# Patient Record
Sex: MALE | Race: BLACK OR AFRICAN AMERICAN | URBAN - METROPOLITAN AREA
[De-identification: names, ages, dates, MRNs, and addresses within clinical notes are randomized per-mention and may not be internally consistent; named-entity substitution may affect disease eponyms.]

---

## 2019-03-21 ENCOUNTER — OFFICE VISIT (OUTPATIENT)
Dept: URBAN - METROPOLITAN AREA CLINIC 11 | Facility: CLINIC | Age: 74
End: 2019-03-21
Payer: COMMERCIAL

## 2019-03-21 DIAGNOSIS — H25.13 AGE-RELATED NUCLEAR CATARACT, BILATERAL: ICD-10-CM

## 2019-03-21 PROCEDURE — 92002 INTRM OPH EXAM NEW PATIENT: CPT | Performed by: OPTOMETRIST

## 2019-03-21 PROCEDURE — 92133 CPTRZD OPH DX IMG PST SGM ON: CPT | Performed by: OPTOMETRIST

## 2019-03-21 ASSESSMENT — INTRAOCULAR PRESSURE
OS: 16
OD: 18

## 2019-03-21 ASSESSMENT — KERATOMETRY
OD: 43.63
OS: 43.88

## 2019-03-21 ASSESSMENT — VISUAL ACUITY
OD: 20/30
OS: 20/40

## 2019-03-21 NOTE — IMPRESSION/PLAN
Impression: Age-related nuclear cataract, bilateral: H25.13. Plan: Discussed diagnosis in detail with patient. No treatment is required at this time. Will continue to observe condition and or symptoms. Call if [address] worsens.

## 2019-03-21 NOTE — IMPRESSION/PLAN
Impression: Chronic angle-closure glaucoma, bilateral, severe stage: P84.2341. Plan:  Angles appear occludable OU. Refer to MD for PI eval. s/s of angle closure call office or go to ER. OCT of RNFL ordered and performed today ou. RNFL thinning ou. Continue Latanoprost 1gt qhs ou and ro/dorz bid ou.

## 2019-04-26 ENCOUNTER — OFFICE VISIT (OUTPATIENT)
Dept: URBAN - METROPOLITAN AREA CLINIC 11 | Facility: CLINIC | Age: 74
End: 2019-04-26
Payer: COMMERCIAL

## 2019-04-26 PROCEDURE — 92002 INTRM OPH EXAM NEW PATIENT: CPT | Performed by: OPHTHALMOLOGY

## 2019-04-26 PROCEDURE — 92020 GONIOSCOPY: CPT | Performed by: OPHTHALMOLOGY

## 2019-04-26 PROCEDURE — 92133 CPTRZD OPH DX IMG PST SGM ON: CPT | Performed by: OPHTHALMOLOGY

## 2019-04-26 PROCEDURE — 92012 INTRM OPH EXAM EST PATIENT: CPT | Performed by: OPHTHALMOLOGY

## 2019-04-26 PROCEDURE — 76514 ECHO EXAM OF EYE THICKNESS: CPT | Performed by: OPHTHALMOLOGY

## 2019-04-26 RX ORDER — PREDNISOLONE ACETATE 10 MG/ML
1 % SUSPENSION/ DROPS OPHTHALMIC
Qty: 1 | Refills: 0 | Status: INACTIVE
Start: 2019-04-26 | End: 2019-09-25

## 2019-04-26 ASSESSMENT — INTRAOCULAR PRESSURE
OD: 19
OS: 18

## 2019-04-26 NOTE — IMPRESSION/PLAN
Impression: Chronic angle-closure glaucoma, bilateral, severe stage: R13.9574. /496, 4/19 OCT 67/62 7/7 Plan: Discussed diagnosis with patient. Recommend patient continue Latanoprost QHS OU and Dorzolamide/Timolol BID OU. [id number] [address] for LPI OU in [facility], [address]. Discussed narrow angles, risk of angle closure, symptoms of AACG and Laser peripheral iridotomy (LPI) risk/benefits/alternatives. Discussed that pt should avoid dilation and anti-depression, anti-anxiety, anti-histamine, or other medications contraindicated in angle closure glaucoma until the laser has been performed. Discussed risk of irreversible vision loss with glaucoma. Pt voiced understanding.

## 2019-05-15 ENCOUNTER — SURGERY (OUTPATIENT)
Dept: URBAN - METROPOLITAN AREA SURGERY 20 | Facility: SURGERY | Age: 74
End: 2019-05-15
Payer: COMMERCIAL

## 2019-05-22 ENCOUNTER — POST-OPERATIVE VISIT (OUTPATIENT)
Dept: URBAN - METROPOLITAN AREA CLINIC 11 | Facility: CLINIC | Age: 74
End: 2019-05-22

## 2019-05-22 PROCEDURE — 99024 POSTOP FOLLOW-UP VISIT: CPT | Performed by: OPTOMETRIST

## 2019-05-22 ASSESSMENT — INTRAOCULAR PRESSURE
OD: 36
OS: 36

## 2019-06-04 ENCOUNTER — POST-OPERATIVE VISIT (OUTPATIENT)
Dept: URBAN - METROPOLITAN AREA CLINIC 11 | Facility: CLINIC | Age: 74
End: 2019-06-04
Payer: COMMERCIAL

## 2019-06-04 PROCEDURE — 99024 POSTOP FOLLOW-UP VISIT: CPT | Performed by: OPTOMETRIST

## 2019-06-04 ASSESSMENT — INTRAOCULAR PRESSURE
OD: 24
OS: 19

## 2019-08-14 ENCOUNTER — OFFICE VISIT (OUTPATIENT)
Dept: URBAN - METROPOLITAN AREA CLINIC 11 | Facility: CLINIC | Age: 74
End: 2019-08-14
Payer: COMMERCIAL

## 2019-08-14 DIAGNOSIS — H40.2233 CHRONIC ANGLE-CLOSURE GLAUCOMA, BILATERAL, SEVERE STAGE: Primary | ICD-10-CM

## 2019-08-14 PROCEDURE — 99213 OFFICE O/P EST LOW 20 MIN: CPT | Performed by: OPHTHALMOLOGY

## 2019-08-14 ASSESSMENT — INTRAOCULAR PRESSURE
OS: 21
OD: 22

## 2019-08-14 NOTE — IMPRESSION/PLAN
Impression: Chronic angle-closure glaucoma, bilateral, severe stage: Y73.8932. Due to CCT goal IOP low teens. /496, 4/19 OCT 67/62 7/7 Plan: IOP too high for ONH. Discussed diagnosis with patient. IOP elevated. Recommend patient d/c Latanoprost. Start Rocklatan QHS OU (sample dispensed to patient) continue Dorzolamide/Timolol BID OU (medication sent to pharmacy) 1 month IOP check, Optos and HVF with Dr José Alvarez.

## 2019-09-23 ENCOUNTER — TESTING ONLY (OUTPATIENT)
Dept: URBAN - METROPOLITAN AREA CLINIC 11 | Facility: CLINIC | Age: 74
End: 2019-09-23
Payer: COMMERCIAL

## 2019-09-23 PROCEDURE — 92083 EXTENDED VISUAL FIELD XM: CPT | Performed by: OPHTHALMOLOGY

## 2019-09-25 ENCOUNTER — OFFICE VISIT (OUTPATIENT)
Dept: URBAN - METROPOLITAN AREA CLINIC 11 | Facility: CLINIC | Age: 74
End: 2019-09-25
Payer: COMMERCIAL

## 2019-09-25 PROCEDURE — 99213 OFFICE O/P EST LOW 20 MIN: CPT | Performed by: OPHTHALMOLOGY

## 2019-09-25 RX ORDER — PREDNISOLONE ACETATE 10 MG/ML
1 % SUSPENSION/ DROPS OPHTHALMIC
Qty: 1 | Refills: 0 | Status: INACTIVE
Start: 2019-09-25 | End: 2021-01-26

## 2019-09-25 ASSESSMENT — INTRAOCULAR PRESSURE
OD: 21
OS: 21

## 2019-09-25 NOTE — IMPRESSION/PLAN
Impression: Chronic angle-closure glaucoma, bilateral, severe stage: E70.2051. Due to CCT goal IOP low teens. /496, 4/19 OCT 67/62 7/7 Rocklatan causes redness. Plan: IOP too high for [address]. Discussed diagnosis and treatment options with patient. Unable to tolerate Rocklatan. Recommend patient continue Latanoprost and Dorzolamide/Timolol BID OU. Discussed the option of the Micropulse G6 Laser procedure to help aid w/ Glaucoma. Recommend Micropulse cytophotocoagulation OS, then OD. R/B/A of procedure discussed. Pt desires to proceed with procedure.

## 2019-10-16 ENCOUNTER — SURGERY (OUTPATIENT)
Dept: URBAN - METROPOLITAN AREA SURGERY 20 | Facility: SURGERY | Age: 74
End: 2019-10-16
Payer: COMMERCIAL

## 2019-10-16 PROCEDURE — 66170 GLAUCOMA SURGERY: CPT | Performed by: OPHTHALMOLOGY

## 2019-10-17 ENCOUNTER — POST-OPERATIVE VISIT (OUTPATIENT)
Dept: URBAN - METROPOLITAN AREA CLINIC 11 | Facility: CLINIC | Age: 74
End: 2019-10-17

## 2019-10-17 PROCEDURE — 99024 POSTOP FOLLOW-UP VISIT: CPT | Performed by: OPTOMETRIST

## 2019-10-17 ASSESSMENT — INTRAOCULAR PRESSURE
OD: 21
OS: 14

## 2019-10-22 ENCOUNTER — POST-OPERATIVE VISIT (OUTPATIENT)
Dept: URBAN - METROPOLITAN AREA CLINIC 45 | Facility: CLINIC | Age: 74
End: 2019-10-22

## 2019-10-22 DIAGNOSIS — Z09 ENCNTR FOR F/U EXAM AFT TRTMT FOR COND OTH THAN MALIG NEOPLM: Primary | ICD-10-CM

## 2019-10-22 ASSESSMENT — INTRAOCULAR PRESSURE
OS: 11
OD: 19

## 2019-12-11 ENCOUNTER — SURGERY (OUTPATIENT)
Dept: URBAN - METROPOLITAN AREA SURGERY 20 | Facility: SURGERY | Age: 74
End: 2019-12-11
Payer: COMMERCIAL

## 2019-12-11 PROCEDURE — 66710 CILIARY TRANSSLERAL THERAPY: CPT | Performed by: OPHTHALMOLOGY

## 2019-12-12 ENCOUNTER — POST-OPERATIVE VISIT (OUTPATIENT)
Dept: URBAN - METROPOLITAN AREA CLINIC 11 | Facility: CLINIC | Age: 74
End: 2019-12-12

## 2019-12-12 PROCEDURE — 99024 POSTOP FOLLOW-UP VISIT: CPT | Performed by: OPTOMETRIST

## 2019-12-12 ASSESSMENT — INTRAOCULAR PRESSURE
OS: 18
OD: 17

## 2019-12-18 ENCOUNTER — POST-OPERATIVE VISIT (OUTPATIENT)
Dept: URBAN - METROPOLITAN AREA CLINIC 11 | Facility: CLINIC | Age: 74
End: 2019-12-18

## 2019-12-18 RX ORDER — DORZOLAMIDE HYDROCHLORIDE AND TIMOLOL MALEATE 20; 5 MG/ML; MG/ML
SOLUTION/ DROPS OPHTHALMIC
Qty: 15 | Refills: 3 | Status: INACTIVE
Start: 2019-12-18 | End: 2020-05-04

## 2019-12-18 ASSESSMENT — INTRAOCULAR PRESSURE
OD: 11
OS: 12

## 2020-03-11 ENCOUNTER — OFFICE VISIT (OUTPATIENT)
Dept: URBAN - METROPOLITAN AREA CLINIC 11 | Facility: CLINIC | Age: 75
End: 2020-03-11
Payer: MEDICARE

## 2020-03-11 PROCEDURE — 99213 OFFICE O/P EST LOW 20 MIN: CPT | Performed by: OPHTHALMOLOGY

## 2020-03-11 ASSESSMENT — INTRAOCULAR PRESSURE
OS: 16
OD: 14

## 2020-03-11 NOTE — IMPRESSION/PLAN
Impression: Chronic angle-closure glaucoma, bilateral, severe stage: E97.6887. Due to CCT goal IOP low teens. /496, 4/19 OCT 67/62 7/7 Rocklatan causes redness. s/p Micropulse  OD 12/11/19 OS 10/16/19 Plan: Discussed diagnosis and treatment options with patient. Recommend patient continue Latanoprost QHS OU and Dorzolamide/Timolol BID OU. Will continue to monitor. 4 month IOP check Dr Cruzito Flor.

## 2020-06-16 ENCOUNTER — OFFICE VISIT (OUTPATIENT)
Dept: URBAN - METROPOLITAN AREA CLINIC 11 | Facility: CLINIC | Age: 75
End: 2020-06-16
Payer: MEDICARE

## 2020-06-16 PROCEDURE — 92012 INTRM OPH EXAM EST PATIENT: CPT | Performed by: OPTOMETRIST

## 2020-06-16 PROCEDURE — 92002 INTRM OPH EXAM NEW PATIENT: CPT | Performed by: OPTOMETRIST

## 2020-06-16 ASSESSMENT — INTRAOCULAR PRESSURE
OD: 11
OS: 17

## 2020-10-09 ENCOUNTER — OFFICE VISIT (OUTPATIENT)
Dept: URBAN - METROPOLITAN AREA CLINIC 11 | Facility: CLINIC | Age: 75
End: 2020-10-09
Payer: MEDICARE

## 2020-10-09 PROCEDURE — 92133 CPTRZD OPH DX IMG PST SGM ON: CPT | Performed by: OPTOMETRIST

## 2020-10-09 PROCEDURE — 92014 COMPRE OPH EXAM EST PT 1/>: CPT | Performed by: OPTOMETRIST

## 2020-10-09 PROCEDURE — 92083 EXTENDED VISUAL FIELD XM: CPT | Performed by: OPTOMETRIST

## 2020-10-09 ASSESSMENT — INTRAOCULAR PRESSURE
OD: 14
OS: 15

## 2020-11-11 ENCOUNTER — OFFICE VISIT (OUTPATIENT)
Dept: URBAN - METROPOLITAN AREA CLINIC 11 | Facility: CLINIC | Age: 75
End: 2020-11-11
Payer: MEDICARE

## 2020-11-11 DIAGNOSIS — H11.31 CONJUNCTIVAL HEMORRHAGE, RIGHT EYE: Primary | ICD-10-CM

## 2020-11-11 PROCEDURE — 92012 INTRM OPH EXAM EST PATIENT: CPT | Performed by: OPTOMETRIST

## 2020-11-11 ASSESSMENT — INTRAOCULAR PRESSURE
OS: 14
OD: 15

## 2020-11-11 NOTE — IMPRESSION/PLAN
Impression: Conjunctival hemorrhage, right eye: H11.31. Plan: Discussed diagnosis in detail with patient. No treatment is required at this time. Will continue to observe condition and or symptoms. Patient instructed to call if condition gets worse.

## 2021-01-26 ENCOUNTER — OFFICE VISIT (OUTPATIENT)
Dept: URBAN - METROPOLITAN AREA CLINIC 11 | Facility: CLINIC | Age: 76
End: 2021-01-26
Payer: MEDICARE

## 2021-01-26 PROCEDURE — 99213 OFFICE O/P EST LOW 20 MIN: CPT | Performed by: OPTOMETRIST

## 2021-01-26 ASSESSMENT — INTRAOCULAR PRESSURE
OS: 15
OD: 14

## 2021-01-26 NOTE — IMPRESSION/PLAN
Impression: Chronic angle-closure glaucoma, bilateral, severe stage: U75.7211. Due to CCT goal IOP low teens. /496, 4/19 OCT 67/62 7/7 Rocklatan causes redness. s/p Micropulse  OD 12/11/19 OS 10/16/19 IOP stable OU Plan: Continue Latanoprost QHS OU, Dorzolamide/Timolol q12h OU.

orig IOP 18/16latdorztim, OCT 76/55(61/60)(67/62), VF OD sup inf arc/mariano OS severe

## 2021-04-28 ENCOUNTER — OFFICE VISIT (OUTPATIENT)
Dept: URBAN - METROPOLITAN AREA CLINIC 11 | Facility: CLINIC | Age: 76
End: 2021-04-28
Payer: MEDICARE

## 2021-04-28 PROCEDURE — 99213 OFFICE O/P EST LOW 20 MIN: CPT | Performed by: OPTOMETRIST

## 2021-04-28 ASSESSMENT — INTRAOCULAR PRESSURE
OD: 15
OS: 15

## 2021-04-28 NOTE — IMPRESSION/PLAN
Impression: Chronic angle-closure glaucoma, bilateral, severe stage: F92.8138. Due to CCT goal IOP low teens. /496, 4/19 OCT 67/62 7/7 Rocklatan causes redness. 
s/p Micropulse  OD 12/11/19 OS 10/16/19
stable IOP OU Plan: Continue Latanoprost QHS OU, Dorzolamide/Timolol q12h OU.

orig IOP 18/16latdorztim, OCT 76/55(61/60)(67/62), VF OD sup inf arc/mariano OS severe

## 2021-09-28 ENCOUNTER — OFFICE VISIT (OUTPATIENT)
Dept: URBAN - METROPOLITAN AREA CLINIC 11 | Facility: CLINIC | Age: 76
End: 2021-09-28
Payer: MEDICARE

## 2021-09-28 PROCEDURE — 99213 OFFICE O/P EST LOW 20 MIN: CPT | Performed by: OPTOMETRIST

## 2021-09-28 ASSESSMENT — INTRAOCULAR PRESSURE
OD: 14
OS: 14

## 2021-09-28 NOTE — IMPRESSION/PLAN
Impression: Chronic angle-closure glaucoma, bilateral, severe stage: L71.3270. Due to CCT goal IOP low teens. /496, 4/19 OCT 67/62 7/7 Rocklatan causes redness. s/p Micropulse  OD 12/11/19 OS 10/16/19 IOP stable OU Plan: Continue Latanoprost QHS OU, Dorzolamide/Timolol q12h OU.

orig IOP 18/16latdorztim, OCT 76/55(61/60)(67/62), VF OD sup inf arc/mariano OS severe not examined

## 2021-09-28 NOTE — IMPRESSION/PLAN
Impression: Combined forms of age-related cataract, bilateral: H25.813. Bilateral. Plan: Cataracts account for the patient's complaints. Discussed options, surgery or spectacle change. Explained surgery risks, benefits, procedures and recovery.   Patient defers surgery and elects to change glasses first.

## 2022-01-27 ENCOUNTER — OFFICE VISIT (OUTPATIENT)
Dept: URBAN - METROPOLITAN AREA CLINIC 11 | Facility: CLINIC | Age: 77
End: 2022-01-27
Payer: MEDICARE

## 2022-01-27 DIAGNOSIS — H25.813 COMBINED FORMS OF AGE-RELATED CATARACT, BILATERAL: ICD-10-CM

## 2022-01-27 PROCEDURE — 92083 EXTENDED VISUAL FIELD XM: CPT | Performed by: OPTOMETRIST

## 2022-01-27 PROCEDURE — 92014 COMPRE OPH EXAM EST PT 1/>: CPT | Performed by: OPTOMETRIST

## 2022-01-27 PROCEDURE — 92133 CPTRZD OPH DX IMG PST SGM ON: CPT | Performed by: OPTOMETRIST

## 2022-01-27 ASSESSMENT — INTRAOCULAR PRESSURE
OS: 15
OD: 15

## 2022-01-27 NOTE — IMPRESSION/PLAN
Impression: Combined forms of age-related cataract, bilateral: H25.813. Bilateral. Plan: Discussed diagnosis in detail with patient. No treatment is required at this time. Will continue to observe condition and or symptoms. Call if [place] worsens.

## 2022-01-27 NOTE — IMPRESSION/PLAN
Impression: Chronic angle-closure glaucoma, bilateral, severe stage: M03.9491. Due to CCT goal IOP low teens. /496; OCT 6/7 [age] (67/62); HVF 01/22 OD: SA OS: [address] [place] [place]. s/p Micropulse  OD 12/11/19 OS 10/16/19

orig IOP 18/16latdorztim, OCT 76/55(61/60)(67/62), VF OD sup inf arc/mariano OS severe Plan: VF and OCT RNFL ordered and preformed today. VF defects OU-stable. RNFL thinning OU-stable. IOP at acceptable level today OU. Continue Latanoprost QHS OU, Dorzolamide/Timolol q12h OU. F/u 3mths IOP check and photos with Dr. Jessica Mitchell.

## 2022-04-26 ENCOUNTER — OFFICE VISIT (OUTPATIENT)
Dept: URBAN - METROPOLITAN AREA CLINIC 11 | Facility: CLINIC | Age: 77
End: 2022-04-26
Payer: MEDICARE

## 2022-04-26 DIAGNOSIS — H40.2233 CHRONIC ANGLE-CLOSURE GLAUCOMA, BILATERAL, SEVERE STAGE: Primary | ICD-10-CM

## 2022-04-26 DIAGNOSIS — H25.11 AGE-RELATED NUCLEAR CATARACT, RIGHT EYE: ICD-10-CM

## 2022-04-26 DIAGNOSIS — H25.812 COMBINED FORMS OF AGE-RELATED CATARACT, LEFT EYE: ICD-10-CM

## 2022-04-26 PROCEDURE — 99213 OFFICE O/P EST LOW 20 MIN: CPT | Performed by: OPHTHALMOLOGY

## 2022-04-26 ASSESSMENT — INTRAOCULAR PRESSURE
OS: 14
OD: 15

## 2022-04-26 NOTE — IMPRESSION/PLAN
Impression: Combined forms of age-related cataract, left eye: H25.812. Plan: Cataracts account for the patient's complaints. No treatment currently recommended. The patient will monitor vision changes and contact us with any decrease in vision.  4 month cat eval

## 2022-04-26 NOTE — IMPRESSION/PLAN
Impression: Chronic angle-closure glaucoma, bilateral, severe stage: Z37.0274. Due to CCT goal IOP low teens. /496; OCT 6/7 73/76 (67/62); HVF 01/22 OD: SA OS: central [address] Rocklatan causes redness. s/p Micropulse  OD 12/11/19 OS 10/16/19 Plan: Discussed diagnosis with patient. IOP good. Recommend patient continue Latanoprost QHS OU and Dorzolamide/Timolol q12h OU. Will continue to monitor. 4 month IOP check.

## 2022-08-29 ENCOUNTER — OFFICE VISIT (OUTPATIENT)
Facility: LOCATION | Age: 77
End: 2022-08-29
Payer: MEDICARE

## 2022-08-29 DIAGNOSIS — H25.812 COMBINED FORMS OF AGE-RELATED CATARACT, LEFT EYE: ICD-10-CM

## 2022-08-29 DIAGNOSIS — H40.2233 CHRONIC ANGLE-CLOSURE GLAUCOMA, BILATERAL, SEVERE STAGE: Primary | ICD-10-CM

## 2022-08-29 DIAGNOSIS — H25.11 AGE-RELATED NUCLEAR CATARACT, RIGHT EYE: ICD-10-CM

## 2022-08-29 PROCEDURE — 99213 OFFICE O/P EST LOW 20 MIN: CPT | Performed by: OPHTHALMOLOGY

## 2022-08-29 ASSESSMENT — INTRAOCULAR PRESSURE
OS: 15
OD: 15

## 2022-08-29 ASSESSMENT — VISUAL ACUITY
OS: 20/60
OD: 20/25

## 2022-08-29 NOTE — IMPRESSION/PLAN
Impression: Chronic angle-closure glaucoma, bilateral, severe stage: T79.3190. Due to CCT goal IOP low teens. /496; OCT 6/7 73/76 (67/62); HVF 01/22 OD: SA OS: [address] [place] causes redness. s/p Micropulse  OD 12/11/19 OS 10/16/19 Plan: Discussed diagnosis with patient. IOP good. Recommend patient continue Latanoprost QHS OU and Dorzolamide/Timolol q12h OU. Will continue to monitor. 4 month IOP check.

## 2023-01-03 ENCOUNTER — OFFICE VISIT (OUTPATIENT)
Facility: LOCATION | Age: 78
End: 2023-01-03
Payer: MEDICARE

## 2023-01-03 DIAGNOSIS — H25.11 AGE-RELATED NUCLEAR CATARACT, RIGHT EYE: ICD-10-CM

## 2023-01-03 DIAGNOSIS — H40.2233 CHRONIC ANGLE-CLOSURE GLAUCOMA, BILATERAL, SEVERE STAGE: Primary | ICD-10-CM

## 2023-01-03 DIAGNOSIS — H25.812 COMBINED FORMS OF AGE-RELATED CATARACT, LEFT EYE: ICD-10-CM

## 2023-01-03 PROCEDURE — 92083 EXTENDED VISUAL FIELD XM: CPT | Performed by: OPHTHALMOLOGY

## 2023-01-03 PROCEDURE — 99213 OFFICE O/P EST LOW 20 MIN: CPT | Performed by: OPHTHALMOLOGY

## 2023-01-03 ASSESSMENT — KERATOMETRY
OD: 43.38
OS: 43.88

## 2023-01-03 ASSESSMENT — INTRAOCULAR PRESSURE
OD: 15
OS: 15

## 2023-01-03 NOTE — IMPRESSION/PLAN
Impression: Chronic angle-closure glaucoma, bilateral, severe stage: L11.6713. Due to CCT goal IOP low teens. /496; OCT 6/7 73/76 (67/62); 1/23 HVF OD: SA/IA OS: CI
Rocklatan causes redness. s/p Micropulse  OD 12/11/19 OS 10/16/19 Plan: Discussed diagnosis with patient. HVF ordered and reviewed with patient. IOP good. Recommend patient continue Latanoprost QHS OU and Dorzolamide/Timolol q12h OU. Will continue to monitor.  Discussed MIGS with ce/iol

## 2023-04-19 ENCOUNTER — OFFICE VISIT (OUTPATIENT)
Facility: LOCATION | Age: 78
End: 2023-04-19
Payer: MEDICARE

## 2023-04-19 DIAGNOSIS — H25.813 COMBINED FORMS OF AGE-RELATED CATARACT, BILATERAL: Primary | ICD-10-CM

## 2023-04-19 DIAGNOSIS — H40.2233 CHRONIC ANGLE-CLOSURE GLAUCOMA, BILATERAL, SEVERE STAGE: ICD-10-CM

## 2023-04-19 PROCEDURE — 99214 OFFICE O/P EST MOD 30 MIN: CPT | Performed by: OPHTHALMOLOGY

## 2023-04-19 ASSESSMENT — INTRAOCULAR PRESSURE
OS: 14
OD: 14

## 2023-04-19 ASSESSMENT — KERATOMETRY
OS: 43.75
OD: 43.63

## 2023-04-19 NOTE — IMPRESSION/PLAN
Impression: Chronic angle-closure glaucoma, bilateral, severe stage: J70.0746. Due to CCT goal IOP low teens. /496; OCT 6/7 75/76 (67/62); 1/23 HVF OD: SA/IA OS: CI
Rocklatan causes redness. s/p Micropulse  OD 12/11/19 OS 10/16/19 Plan: Discussed diagnosis with patient. IOP is stable. Recommend MIGS with CE/IOL. Porceed with OS first then possibly. Continue Latanoprost QHS OU and Dorzolamide/Timolol q12h OU.

## 2023-04-19 NOTE — IMPRESSION/PLAN
Impression: Combined forms of age-related cataract, bilateral: H25.813. Plan: OK for CE/Phaco/IOL/ OMNI/ Durysta OS then possibly OD in ASC, RL2. Add Omidria. Distance target. Discussed lens options, Toric/Trifocal/ORA. Discussed intralacrimal Dextenza and/or intracameral Dexycu/Moxifloxacin. Pt is a candidate for multifocal lens. Discussed need for glasses for near vision with standard IOL and possibly Trifocal as well. Discussed diagnosis of cataracts. Cataracts are limiting vision. Discussed risks, benefits and alternatives to surgery including but not limited to: bleeding, infection, risk of vision loss, loss of the eye, need for other surgery. Patient voiced understanding and wishes to proceed. 
Schedule FABIEN, [place], H&P, CE/Phaco/IOL/ OMNI/ Durysta OS then possibly OD

## 2023-11-07 ENCOUNTER — OFFICE VISIT (OUTPATIENT)
Facility: LOCATION | Age: 78
End: 2023-11-07
Payer: MEDICARE

## 2023-11-07 DIAGNOSIS — H40.2233 CHRONIC ANGLE-CLOSURE GLAUCOMA, BILATERAL, SEVERE STAGE: Primary | ICD-10-CM

## 2023-11-07 DIAGNOSIS — H25.813 COMBINED FORMS OF AGE-RELATED CATARACT, BILATERAL: ICD-10-CM

## 2023-11-07 PROCEDURE — 99213 OFFICE O/P EST LOW 20 MIN: CPT | Performed by: OPHTHALMOLOGY

## 2023-11-07 ASSESSMENT — INTRAOCULAR PRESSURE
OD: 12
OS: 14

## 2024-03-06 ENCOUNTER — OFFICE VISIT (OUTPATIENT)
Facility: LOCATION | Age: 79
End: 2024-03-06
Payer: MEDICARE

## 2024-03-06 DIAGNOSIS — H40.2233 CHRONIC ANGLE-CLOSURE GLAUCOMA, BILATERAL, SEVERE STAGE: ICD-10-CM

## 2024-03-06 DIAGNOSIS — H25.813 COMBINED FORMS OF AGE-RELATED CATARACT, BILATERAL: Primary | ICD-10-CM

## 2024-03-06 PROCEDURE — 99214 OFFICE O/P EST MOD 30 MIN: CPT | Performed by: OPHTHALMOLOGY

## 2024-03-06 PROCEDURE — 92083 EXTENDED VISUAL FIELD XM: CPT | Performed by: OPHTHALMOLOGY

## 2024-03-06 PROCEDURE — 92133 CPTRZD OPH DX IMG PST SGM ON: CPT | Performed by: OPHTHALMOLOGY

## 2024-03-06 RX ORDER — PREDNISOLONE ACETATE 10 MG/ML
1 % SUSPENSION/ DROPS OPHTHALMIC
Qty: 10 | Refills: 1 | Status: ACTIVE
Start: 2024-03-06

## 2024-03-06 ASSESSMENT — VISUAL ACUITY
OS: 20/40
OD: 20/25

## 2024-03-06 ASSESSMENT — KERATOMETRY
OD: 43.75
OS: 43.50

## 2024-03-06 ASSESSMENT — INTRAOCULAR PRESSURE
OS: 12
OD: 13

## 2024-10-09 ENCOUNTER — OFFICE VISIT (OUTPATIENT)
Dept: URBAN - METROPOLITAN AREA CLINIC 45 | Facility: CLINIC | Age: 79
End: 2024-10-09
Payer: MEDICARE

## 2024-10-09 DIAGNOSIS — H40.2233 CHRONIC ANGLE-CLOSURE GLAUCOMA, BILATERAL, SEVERE STAGE: Primary | ICD-10-CM

## 2024-10-09 DIAGNOSIS — H25.813 COMBINED FORMS OF AGE-RELATED CATARACT, BILATERAL: ICD-10-CM

## 2024-10-09 PROCEDURE — 99213 OFFICE O/P EST LOW 20 MIN: CPT | Performed by: OPHTHALMOLOGY

## 2024-10-09 ASSESSMENT — INTRAOCULAR PRESSURE
OD: 10
OS: 10

## 2025-04-07 ENCOUNTER — OFFICE VISIT (OUTPATIENT)
Facility: LOCATION | Age: 80
End: 2025-04-07
Payer: COMMERCIAL

## 2025-04-07 DIAGNOSIS — H40.2233 CHRONIC ANGLE-CLOSURE GLAUCOMA, BILATERAL, SEVERE STAGE: ICD-10-CM

## 2025-04-07 DIAGNOSIS — H25.813 COMBINED FORMS OF AGE-RELATED CATARACT, BILATERAL: Primary | ICD-10-CM

## 2025-04-07 PROCEDURE — 99213 OFFICE O/P EST LOW 20 MIN: CPT | Performed by: OPHTHALMOLOGY

## 2025-04-07 ASSESSMENT — INTRAOCULAR PRESSURE
OS: 11
OD: 12

## 2025-08-06 ENCOUNTER — OFFICE VISIT (OUTPATIENT)
Facility: LOCATION | Age: 80
End: 2025-08-06
Payer: COMMERCIAL

## 2025-08-06 DIAGNOSIS — H25.813 COMBINED FORMS OF AGE-RELATED CATARACT, BILATERAL: ICD-10-CM

## 2025-08-06 DIAGNOSIS — H40.2233 CHRONIC ANGLE-CLOSURE GLAUCOMA, BILATERAL, SEVERE STAGE: Primary | ICD-10-CM

## 2025-08-06 PROCEDURE — 92133 CPTRZD OPH DX IMG PST SGM ON: CPT | Performed by: OPHTHALMOLOGY

## 2025-08-06 PROCEDURE — 99213 OFFICE O/P EST LOW 20 MIN: CPT | Performed by: OPHTHALMOLOGY

## 2025-08-06 PROCEDURE — 92083 EXTENDED VISUAL FIELD XM: CPT | Performed by: OPHTHALMOLOGY

## 2025-08-06 RX ORDER — LATANOPROST 50 UG/ML
0.005 % SOLUTION/ DROPS OPHTHALMIC
Qty: 2.5 | Refills: 5 | Status: ACTIVE
Start: 2025-08-06

## 2025-08-06 RX ORDER — DORZOLAMIDE HYDROCHLORIDE AND TIMOLOL MALEATE 20; 5 MG/ML; MG/ML
SOLUTION/ DROPS OPHTHALMIC
Qty: 10 | Refills: 3 | Status: ACTIVE
Start: 2025-08-06

## 2025-08-06 ASSESSMENT — INTRAOCULAR PRESSURE
OD: 11
OS: 11